# Patient Record
Sex: FEMALE | Race: WHITE
[De-identification: names, ages, dates, MRNs, and addresses within clinical notes are randomized per-mention and may not be internally consistent; named-entity substitution may affect disease eponyms.]

---

## 2017-01-06 ENCOUNTER — HOSPITAL ENCOUNTER (EMERGENCY)
Dept: HOSPITAL 68 - ERH | Age: 27
End: 2017-01-06
Payer: COMMERCIAL

## 2017-01-06 VITALS — HEIGHT: 64 IN | BODY MASS INDEX: 25.61 KG/M2 | WEIGHT: 150 LBS

## 2017-01-06 VITALS — DIASTOLIC BLOOD PRESSURE: 86 MMHG | SYSTOLIC BLOOD PRESSURE: 132 MMHG

## 2017-01-06 DIAGNOSIS — K64.9: ICD-10-CM

## 2017-01-06 DIAGNOSIS — R19.7: Primary | ICD-10-CM

## 2017-01-06 DIAGNOSIS — R10.9: ICD-10-CM

## 2017-01-06 LAB
ABSOLUTE GRANULOCYTE CT: 11 /CUMM (ref 1.4–6.5)
BASOPHILS # BLD: 0.1 /CUMM (ref 0–0.2)
BASOPHILS NFR BLD: 1.2 % (ref 0–2)
EOSINOPHIL # BLD: 0.3 /CUMM (ref 0–0.7)
EOSINOPHIL NFR BLD: 2.4 % (ref 0–5)
ERYTHROCYTE [DISTWIDTH] IN BLOOD BY AUTOMATED COUNT: 13.9 % (ref 11.5–14.5)
GRANULOCYTES NFR BLD: 84.9 % (ref 42.2–75.2)
HCT VFR BLD CALC: 40.3 % (ref 37–47)
LYMPHOCYTES # BLD: 1 /CUMM (ref 1.2–3.4)
MCH RBC QN AUTO: 30.1 PG (ref 27–31)
MCHC RBC AUTO-ENTMCNC: 33.7 G/DL (ref 33–37)
MCV RBC AUTO: 89.3 FL (ref 81–99)
MONOCYTES # BLD: 0.5 /CUMM (ref 0.1–0.6)
PLATELET # BLD: 344 /CUMM (ref 130–400)
PMV BLD AUTO: 9.4 FL (ref 7.4–10.4)
RED BLOOD CELL CT: 4.51 /CUMM (ref 4.2–5.4)
WBC # BLD AUTO: 12.9 /CUMM (ref 4.8–10.8)

## 2017-01-06 NOTE — ED GENERAL ADULT
History of Present Illness
 
General
Chief Complaint: Abdominal Pain/Flank Pain
Stated Complaint: ABD PAIN
Source: patient
Exam Limitations: no limitations
 
Vital Signs & Intake/Output
Vital Signs & Intake/Output
 Vital Signs
 
 
Date Time Temp Pulse Resp B/P Pulse O2 O2 Flow FiO2
 
     Ox Delivery Rate 
 
 1408 97.8 82 18 132/86 100 Room Air  
 
 1212 97.6 78 17 138/89 99 Room Air  
 
 0944 97.2 96 18 136/92 99 Room Air  
 
 
Allergies
Coded Allergies:
NO KNOWN ALLERGIES (16)
 
Reconcile Medications
Dicyclomine Hydrochloride (Bentyl) 10 MG CAPSULE   1 CAP PO TID PRN CRAMPS
Esomeprazole (Nexium) 40 MG CAPSULE.DR   1 CAP PO BID GI  (Reported)
NORGESTIMATE-ETHINYL ESTRADIOL (Ortho Tri-Cyclen Lo Tablet) [id number] LO TABLET   
1 TAB PO DAILY BIRTH CONTROL  (Reported)
Ondansetron HCl (Zofran) 4 MG TABLET   1 TAB PO Q8P PRN NAUSEA
 
Triage Note:
27 Y/O FEMALE C/O N/V/D AND PAIN FROM HEMMOROID
 SINCE LAST NIGHT.  REPORTS RECENT ILLNESS IN HOME
 WITH SAME SYMPTOMS.  AFEBRILE.
 CURRENT FINISHING MENSES, "NORMAL" PER PT
Triage Nurses Notes Reviewed? yes
Onset: Abrupt
Duration: day(s):
Timing: recent history
Pregnant: No
Patient currently breastfeeds: No
HPI:
 
 
 
17
11 AM
26-year-old female presents to the emergency department with multiple episodes 
of diarrhea, nausea, some mild abdominal cramping and severe hemorrhoidal pain. 
The patient states that over the last 24 hours she's had multiple episodes of 
watery diarrhea there was even some blood in it.  Now she said the pains are 
from her hemorrhoid ; severe.  The onset of symptoms were abrupt, the duration 
has been approximately 48 hours, the severity is significant; as her symptoms 
required her to come to the emergency department for care.  She has a past 
medical history of migraine headaches, abdominal pain, status post appendectomy 
and cholecystectomy.  On physical exam she has some lower abdominal tenderness 
no rebound or guarding she does have a external moderately inflamed hemorrhoid 
with some erythema surrounding the perianal area.  Rectal exam was guaiac 
negative.
 
Past History
 
Travel History
Traveled to [place] past 21 day No
 
Medical History
Any Pertinent Medical History? see below for history
Neurological: NONE
EENT: NONE
Cardiovascular: NONE
Respiratory: NONE
Gastrointestinal: GERD, hiatal hernia
Hepatic: NONE
Renal: KIDNEY STONES KIDNEY STENTS
Musculoskeletal: NONE
Psychiatric: NONE
Endocrine: NONE
Blood Disorders: NONE
Cancer(s): NONE
GYN/Reproductive: NONE
History of MRSA: No
History of VRE: No
History of CDIFF: No
 
Surgical History
Surgical History: appendectomy, cholecystectomy, , d&C
 
Psychosocial History
Who do you live with Family
Services at Home None
What is your primary language English
Tobacco Use: Quit >30 days ago
 
Family History
Hx Contributory? No
 
Review of Systems
 
Review of Systems
Constitutional:
Denies: fever. 
EENTM:
Denies: visual changes. 
Respiratory:
Denies: short of breath. 
Cardiovascular:
Denies: chest pain. 
GI:
Reports: abdominal pain, diarrhea, nausea. 
Genitourinary:
Reports: see HPI. 
Musculoskeletal:
Reports: no symptoms. 
Skin:
Reports: rash. 
Neurological/Psychological:
Denies: headache. 
Hematologic/Endocrine:
Reports: bleeding.  Denies: bruising. 
 
Physical Exam
 
Physical Exam
General Appearance: alert, awake, anxious, moderate distress
Head: atraumatic, normal appearance
Eyes:
Bilateral: normal appearance, PERRL, EOMI. 
Ears, Nose, Throat: normal pharynx, normal ENT inspection
Neck: normal inspection, supple, full range of motion
Respiratory: normal breath sounds, chest non-tender, no respiratory distress
Cardiovascular: regular rate/rhythm
Peripheral Pulses:
4+ radial (R), 4+ radial (L)
Gastrointestinal: soft, non-tender
Rectal: heme negative stool, hemorrhoids, IRRITATION
Extremities: normal inspection, normal range of motion, no edema
Neurologic/Psych: no motor/sensory deficits, awake, alert, oriented x 3
Skin: intact, normal color, warm/dry
 
Core Measures
ACS in differential dx? No
CVA/TIA Diagnosis: No
Severe Sepsis Present: No
Septic Shock Present: No
 
Progress
Differential Diagnoses
I considered the following diagnoses in my evaluation of the patient: [Irritable
bowel syndrome, viral gastroenteritis, intra-abdominal abscess, diverticulitis, 
ectopic pregnancy, pyelonephritis, external thrombosed hemorrhoid, hemorrhoids, 
perianal irritation]
 
Plan of Care:
 Orders
 
 
Procedure Date/time Status
 
URINALYSIS  1037 Complete
 
LIPASE  1037 Complete
 
COMPREHENSIVE METABOLIC PANEL  1037 Complete
 
CBC WITHOUT DIFFERENTIAL  1037 Complete
 
AMYLASE  1037 Complete
 
 
 Laboratory Tests
 
 
 
17 1105:
Urinalysis LIGHT  H, Urine Color BLDY  H, Urine Clarity CLDY  H, Urine pH 6.0, 
Ur Specific Gravity >= 1.030, Urine Protein 100  H, Urine Ketones NEG, Urine 
Nitrite POS  H, Urine Bilirubin NEG@ICTO, Urine Urobilinogen 0.2, Ur Leukocyte 
Esterase TRACE  H, Ur Microscopic SEDIMENT EXAMINED, Urine RBC >75  H, Urine WBC
1-3  H, Ur Epithelial Cells FEW, Urine Hemoglobin LARGE  H, Urine Glucose NEG
 
17 1050:
Anion Gap 16, Estimated GFR > 60, BUN/Creatinine Ratio 20.0, Glucose 92, Calcium
10.0, Total Bilirubin 0.9, AST 20, ALT 30, Alkaline Phosphatase 81, Total 
Protein 8.0, Albumin 4.7, Globulin 3.3, Albumin/Globulin Ratio 1.4, Amylase < 30
 L, Lipase 49, CBC w Diff NO MAN DIFF REQ, RBC 4.51, MCV 89.3, MCH 30.1, RDW 
13.9, MPV 9.4, Gran % 84.9  H, Lymphocytes % 7.5  L, Monocytes % 4.0, 
Eosinophils % 2.4, Basophils % 1.2, Absolute Granulocytes 11.0  H, Absolute 
Lymphocytes 1.0  L, Absolute Monocytes 0.5, Absolute Eosinophils 0.3, Absolute 
Basophils 0.1, PUBS MCHC 33.7
Initial ED EKG: none
 
Departure
 
Departure
Disposition: STILL A PATIENT
Condition: Stable
Clinical Impression
Primary Impression: Diarrhea
Secondary Impressions: Abdominal pain, Hemorrhoid
Referrals:
FAUSTO CANALES MD (PCP/Family)
 
Departure Forms:
Customer Survey
General Discharge Information
Prescriptions:
Current Visit Scripts
Ondansetron HCl (Zofran) 1 TAB PO Q8P PRN NAUSEA
     #6 TAB 
 
Dicyclomine Hydrochloride (Bentyl) 1 CAP PO TID PRN CRAMPS
     #20 CAP 
 
 
Comments
 
 
 
17
2:28 PM
The patient has no abdominal pain at this time.  She status post appendectomy 
and cholecystectomy.  Liver enzymes and labs are essentially normal.  She does 
have hematuria but she is currently on her menstrual period.  Her perianal pain 
was somewhat relieved with viscous lidocaine.  I will treat her external 
hemorrhoid and her likely gastroenteritis symptoms.  She will follow-up with her
doctor on Monday or return to the emergency department if worse.
 
Critical Care Note
 
Critical Care Note
Critical Care Time: non-applicable

## 2017-01-18 ENCOUNTER — HOSPITAL ENCOUNTER (EMERGENCY)
Dept: HOSPITAL 68 - ERH | Age: 27
End: 2017-01-18
Payer: COMMERCIAL

## 2017-01-18 VITALS — WEIGHT: 145 LBS | BODY MASS INDEX: 24.75 KG/M2 | HEIGHT: 64 IN

## 2017-01-18 VITALS — SYSTOLIC BLOOD PRESSURE: 123 MMHG | DIASTOLIC BLOOD PRESSURE: 89 MMHG

## 2017-01-18 DIAGNOSIS — K58.0: ICD-10-CM

## 2017-01-18 DIAGNOSIS — R10.11: Primary | ICD-10-CM

## 2017-01-18 LAB
ABSOLUTE GRANULOCYTE CT: 8.6 /CUMM (ref 1.4–6.5)
BASOPHILS # BLD: 0.2 /CUMM (ref 0–0.2)
BASOPHILS NFR BLD: 1.5 % (ref 0–2)
EOSINOPHIL # BLD: 0.1 /CUMM (ref 0–0.7)
EOSINOPHIL NFR BLD: 1.2 % (ref 0–5)
ERYTHROCYTE [DISTWIDTH] IN BLOOD BY AUTOMATED COUNT: 13.6 % (ref 11.5–14.5)
GRANULOCYTES NFR BLD: 75 % (ref 42.2–75.2)
HCT VFR BLD CALC: 37.5 % (ref 37–47)
LYMPHOCYTES # BLD: 2 /CUMM (ref 1.2–3.4)
MCH RBC QN AUTO: 29.8 PG (ref 27–31)
MCHC RBC AUTO-ENTMCNC: 34.3 G/DL (ref 33–37)
MCV RBC AUTO: 86.9 FL (ref 81–99)
MONOCYTES # BLD: 0.6 /CUMM (ref 0.1–0.6)
PLATELET # BLD: 330 /CUMM (ref 130–400)
PMV BLD AUTO: 8.1 FL (ref 7.4–10.4)
RED BLOOD CELL CT: 4.32 /CUMM (ref 4.2–5.4)
WBC # BLD AUTO: 11.5 /CUMM (ref 4.8–10.8)

## 2017-01-18 NOTE — ED GI/GU/ABDOMINAL COMPLAINT
History of Present Illness
 
General
Chief Complaint: Nausea, Vomiting, Diarrhea
Stated Complaint: N/V/D, ABDOMINAL PAIN
Source: patient
Exam Limitations: no limitations
 
Vital Signs & Intake/Output
Vital Signs & Intake/Output
 Vital Signs
 
 
Date Time Temp Pulse Resp B/P Pulse O2 O2 Flow FiO2
 
     Ox Delivery Rate 
 
 1642 97.9 100 16 123/89 99 Room Air  
 
 
Allergies
Coded Allergies:
NO KNOWN ALLERGIES (16)
 
Reconcile Medications
Dicyclomine Hydrochloride (Bentyl) 10 MG CAPSULE   1 CAP PO TID PRN CRAMPS
Esomeprazole (Nexium) 40 MG CAPSULE.DR   1 CAP PO BID GI  (Reported)
NORGESTIMATE-ETHINYL ESTRADIOL (Ortho Tri-Cyclen Lo Tablet) [id number] LO TABLET   
1 TAB PO DAILY BIRTH CONTROL  (Reported)
Ondansetron (Zofran Odt) 4 MG TAB.RAPDIS   1 TAB SL TID PRN nausea and vomiting
Ondansetron HCl (Zofran) 4 MG TABLET   1 TAB PO Q8P PRN NAUSEA
Tramadol HCl 50 MG TABLET   1 TAB PO BIDP PRN pain
 
Triage Note:
PT C/O N/V/D FOR AND ABD PAIN SINCE THIS AM.
Triage Nurses Notes Reviewed? yes
Pregnant? N
Is pt currently breastfeeding? No
HPI:
This patient is a 26-year-old female with past medical history including GERD 
and hiatal hernia who presented to the emergency department today for evaluation
of nausea, vomiting, diarrhea, and abdominal pain since this morning.  The 
patient reported that she woke up this morning and had approximately 5 episodes 
of nonbloody diarrhea.  She reported that she started feeling nauseous and she 
laid down to sleep.  She reported that at approximately 1:00 this afternoon she 
started vomiting.  She reported that she vomited 3 times.  Nonbloody bile.  The 
patient reported 10 out of 10 right upper quadrant abdominal pain which is 
nonradiating and constant since onset.  No palliative or provoking factors.  She
reported that it is not related to food or drink intake.  She denied any fevers,
chills, chest pain, difficult breathing, back pain, urinary burning, urgency, 
frequency, or blood in the urine.  The patient reported that her last menstrual 
period was approximately 2 weeks ago and normal.  Her last bowel movement was 
approximately 2 days ago and normal.
(AN MINA,JESSICA)
 
Past History
 
Travel History
Traveled to [place] past 21 day No
 
Medical History
Any Pertinent Medical History? see below for history
Neurological: NONE
EENT: NONE
Cardiovascular: NONE
Respiratory: NONE
Gastrointestinal: GERD, hiatal hernia
Hepatic: NONE
Renal: KIDNEY STONES
Musculoskeletal: NONE
Psychiatric: NONE
Endocrine: NONE
Blood Disorders: NONE
Cancer(s): NONE
GYN/Reproductive: NONE
History of MRSA: No
History of VRE: No
History of CDIFF: No
 
Surgical History
Surgical History: appendectomy, cholecystectomy, , d&C, kidney stents
 
Psychosocial History
Who do you live with Family
Services at Home None
What is your primary language English
Tobacco Use: Quit >30 days ago
ETOH Use: denies use
Illicit Drug Use: denies illicit drug use
 
Family History
Hx Contributory? No
(JESSICA NOLAN PA-C)
 
Review of Systems
 
Review of Systems
Constitutional:
Reports: no symptoms. 
EENTM:
Reports: no symptoms. 
Respiratory:
Reports: no symptoms. 
Cardiovascular:
Reports: no symptoms. 
GI:
Reports: see HPI. 
Genitourinary:
Reports: no symptoms. 
Musculoskeletal:
Reports: no symptoms. 
Skin:
Reports: no symptoms. 
Neurological/Psychological:
Reports: no symptoms. 
All Other Systems: Reviewed and Negative
(JESSICA NOLAN PA-C)
 
Physical Exam
 
Physical Exam
Gastrointestinal: normal bowel sounds, soft, no organomegaly, nondistended.  No 
organomegaly appreciated.  Tympanic to percussion in all 4 quadrants.  Right 
upper quadrant tenderness to palpation with no rebound or guarding.  No McBurney
's point tenderness.
Comments:
Well-developed well-nourished person in mild distress
HEENT: Normal EENT exam, head normocephalic, moist mucous membranes
Pupils equally round and reactive to light.
Neck: Supple, no lymphadenopathy
Back: Normal gait.  Normal inspection
Cardiovascular: Regular rate and rhythm with no murmurs, rubs or gallops
Respiratory: No respiratory distress. Breath sounds clear to auscultation 
bilaterally
Extremity: Normal and equal pulses.
Neuro: Alert oriented x3, cranial nerves II through XII grossly intact.
Skin: No appreciable rash on exposed skin, skin is warm and dry.
Psych: Mood and affect is normal
 
Core Measures
ACS in differential dx? No
Severe Sepsis Present: No
Septic Shock Present: No
(JESSICA NOLAN PA-C)
 
Progress
Differential Diagnosis: AAA, AMI, biliary colic, bowel obstruction, colon cancer
, cholecystitis, diverticulitis, ectopic pregnancy, endometritis, gastritis, 
hepatitis, ischemic bowel, inflamm bowel dis, intrauterine pregnancy, kidney 
stone, ovarian cyst, ovarian torsion, pancreatitis, PID/cervicitis, PUD/GERD, 
perforated viscous, threatened AB, UTI/pyelo
Plan of Care:
 Orders
 
 
Procedure Date/time Status
 
LACTIC ACID 2033 Active
 
RAPID VIRAL INFLUENZA A 1733 Complete
 
URINE PREGNANCY 1733 Complete
 
URINALYSIS 1733 Complete
 
LIPASE 1733 Complete
 
LACTIC ACID 1733 Complete
 
DIRECT BILIRUBIN 1733 Complete
 
COMPREHENSIVE METABOLIC PANEL 1733 Complete
 
CBC WITHOUT DIFFERENTIAL 1733 Complete
 
AMYLASE 1733 Complete
 
 
 Laboratory Tests
 
 
17 1805:
Urinalysis LIGHT  H, Urine Color YEL, Urine Clarity HAZY  H, Urine pH 6.0, Ur 
Specific Gravity 1.025, Urine Protein NEG, Urine Ketones >=80, Urine Nitrite NEG
, Urine Bilirubin NEG, Urine Urobilinogen 0.2, Ur Leukocyte Esterase NEG, Ur 
Microscopic SEDIMENT EXAMINED, Urine RBC RARE, Urine WBC 1-3  H, Ur Epithelial 
Cells PACKD  H, Urine Mucus MANY  H, Urine Hemoglobin TRACE-INTACT, Urine 
Glucose NEG, Urine Pregnancy Test NEGATIVE
 
17 1754:
Anion Gap 14, Estimated GFR > 60, BUN/Creatinine Ratio 11.7, Glucose 84, Lactic 
Acid 0.8, Calcium 9.8, Total Bilirubin 1.0, Direct Bilirubin 0.4, AST 24, ALT 39
, Alkaline Phosphatase 139  H, Total Protein 7.4, Albumin 4.4, Globulin 3.0, 
Albumin/Globulin Ratio 1.5, Amylase < 30  L, Lipase 36, CBC w Diff NO MAN DIFF 
REQ, RBC 4.32, MCV 86.9, MCH 29.8, RDW 13.6, MPV 8.1, Gran % 75.0, Lymphocytes %
17.2  L, Monocytes % 5.1, Eosinophils % 1.2, Basophils % 1.5, Absolute 
Granulocytes 8.6  H, Absolute Lymphocytes 2.0, Absolute Monocytes 0.6, Absolute 
Eosinophils 0.1, Absolute Basophils 0.2, PUBS MCHC 34.3
Diagnostic Imaging:
Viewed by Me: Ultrasound.  Discussed w/RAD: Ultrasound. 
Radiology Impression: PATIENT: PAGE NAIR  MEDICAL RECORD NO: [id number] 
PRESENT AGE: 26  PATIENT ACCOUNT NO: [id number] : 88  LOCATION: [facility] 
ORDERING PHYSICIAN: JESSICA NOLAN PA-C     SERVICE DATE:  EXAM 
TYPE: US - US-LIMITED ABDOMEN EXAMINATION: US ABDOMEN LIMITED CLINICAL 
INFORMATION: Right upper quadrant pain. Concern for retained gallbladder stone. 
Status post cholecystectomy, 2016.. COMPARISON: Ultrasound of abdomen 2015 TECHNIQUE: Real-time imaging of the right upper quadrant abdominal 
viscera. Color Doppler exam utilized. FINDINGS: PANCREAS: Normal. LIVER: Normal.
The liver demonstrates normal size, contour and echogenicity. No focal lesion or
intrahepatic biliary duct dilatation. GALLBLADDER: Status post cholecystectomy. 
No fluid collection at the gallbladder bed. COMMON BILE DUCT: Normal in caliber 
measuring 0.6 cm in diameter. No evidence of gallstone in the visualized bile 
ducts. RIGHT KIDNEY: Normal. No hydronephrosis. No renal calculi or focal 
parenchymal lesions. The kidney measures 10.4 cm in maximum dimension. FREE 
FLUID: None. IMPRESSION: Status post cholecystectomy. No bile duct dilatation. 
No evidence of choledocholithiasis. DICTATED BY: CURTIS STANFORD MD  DATE/TIME 
DICTATED:17 :[facility]  DATE/TIME TRANSCRIBED:1902 CONFIDENTIAL, DO NOT COPY WITHOUT APPROPRIATE AUTHORIZATION.  <
Electronically signed in Other Vendor System>                                   
                                                    SIGNED BY: CURTIS STANFORD MD 1908
Initial ED EKG: none
Comments:
2017 7:13:18 PM: This patient reported that her abdominal pain seems to be 
better than before.  She reported relief of her nausea.  1 L of IV fluids has 
infused.  She reported that she feels comfortable to go home.  She will follow 
up with her GI doctor.
(AN MINA,JESSICA)
 
Departure
 
Departure
Disposition: HOME OR SELF CARE
Condition: Stable
Clinical Impression
Primary Impression: Abdominal pain
Qualifiers:  Abdominal location: unspecified location Qualified Code: R10.9 - 
Unspecified abdominal pain
Referrals:
FAUSTO CANALES MD (PCP/Family)
 
Additional Instructions:
Take medication for pain as prescribed.  Takes Zofran as prescribed for nausea. 
Please call your gastroenterologist tomorrow for a follow-up appointment.  
Return for any worsening symptoms or concerns.  Please stay on a clear liquid 
diet over the next 24 hours and then advance her diet as tolerated.
Departure Forms:
Customer Survey
General Discharge Information
Prescriptions:
Current Visit Scripts
Tramadol HCl 1 TAB PO BIDP PRN pain
     #6 TAB 
 
Ondansetron (Zofran Odt) 1 TAB SL TID PRN nausea and vomiting
     #10 TAB 
 
 
(JESSICA NOLAN PA-C)
 
PA/NP Co-Sign Statement
Statement:
ED Attending supervision documentation-
 
[] I saw and evaluated the patient. I have also reviewed all the pertinent lab 
results and diagnostic results. I agree with the findings and the plan of care 
as documented in the PA's/NP's documentation. 
 
[X] I have reviewed the ED Record and agree with the PA's/NP's documentation.
 
[] Additions or exceptions (if any) to the PAs/NP's note and plan are 
summarized below:
[]
 
(DALLAS SIMONS,DILIP)

## 2017-01-18 NOTE — ULTRASOUND REPORT
EXAMINATION:
US ABDOMEN LIMITED
 
CLINICAL INFORMATION:
Right upper quadrant pain. Concern for retained gallbladder stone. Status
post cholecystectomy, 04/20/2016..
 
COMPARISON:
Ultrasound of abdomen 12/09/2015
 
TECHNIQUE:
Real-time imaging of the right upper quadrant abdominal viscera.
Color Doppler exam utilized.
 
FINDINGS:
 
PANCREAS: Normal.
 
LIVER: Normal. The liver demonstrates normal size, contour and echogenicity.
No focal lesion or intrahepatic biliary duct dilatation.
 
GALLBLADDER: Status post cholecystectomy. No fluid collection at the
gallbladder bed.
 
COMMON BILE DUCT: Normal in caliber measuring 0.6 cm in diameter. No evidence
of gallstone in the visualized bile ducts.
 
RIGHT KIDNEY: Normal. No hydronephrosis. No renal calculi or focal
parenchymal lesions. The kidney measures 10.4 cm in maximum dimension.
 
FREE FLUID: None.
 
IMPRESSION:
Status post cholecystectomy. No bile duct dilatation. No evidence of
choledocholithiasis.

## 2017-03-31 ENCOUNTER — HOSPITAL ENCOUNTER (EMERGENCY)
Dept: HOSPITAL 68 - ERH | Age: 27
End: 2017-03-31
Payer: COMMERCIAL

## 2017-03-31 VITALS — DIASTOLIC BLOOD PRESSURE: 84 MMHG | SYSTOLIC BLOOD PRESSURE: 126 MMHG

## 2017-03-31 VITALS — HEIGHT: 64 IN | BODY MASS INDEX: 22.2 KG/M2 | WEIGHT: 130 LBS

## 2017-03-31 DIAGNOSIS — R10.84: Primary | ICD-10-CM

## 2017-03-31 DIAGNOSIS — M25.572: ICD-10-CM

## 2017-03-31 DIAGNOSIS — R11.2: ICD-10-CM

## 2017-03-31 LAB
ABSOLUTE GRANULOCYTE CT: 6.6 /CUMM (ref 1.4–6.5)
BASOPHILS # BLD: 0.1 /CUMM (ref 0–0.2)
BASOPHILS NFR BLD: 1.5 % (ref 0–2)
EOSINOPHIL # BLD: 0.1 /CUMM (ref 0–0.7)
EOSINOPHIL NFR BLD: 1.2 % (ref 0–5)
ERYTHROCYTE [DISTWIDTH] IN BLOOD BY AUTOMATED COUNT: 13.8 % (ref 11.5–14.5)
GRANULOCYTES NFR BLD: 68.7 % (ref 42.2–75.2)
HCT VFR BLD CALC: 41 % (ref 37–47)
LYMPHOCYTES # BLD: 2.3 /CUMM (ref 1.2–3.4)
MCH RBC QN AUTO: 29.5 PG (ref 27–31)
MCHC RBC AUTO-ENTMCNC: 33.7 G/DL (ref 33–37)
MCV RBC AUTO: 87.6 FL (ref 81–99)
MONOCYTES # BLD: 0.5 /CUMM (ref 0.1–0.6)
PLATELET # BLD: 389 /CUMM (ref 130–400)
PMV BLD AUTO: 8.1 FL (ref 7.4–10.4)
RED BLOOD CELL CT: 4.68 /CUMM (ref 4.2–5.4)
WBC # BLD AUTO: 9.7 /CUMM (ref 4.8–10.8)

## 2017-03-31 NOTE — RADIOLOGY REPORT
EXAMINATION:
XR ANKLE, LEFT
 
CLINICAL INFORMATION:
Left ankle pain status post fall down stairs.
 
COMPARISON:
06/25/2015
 
TECHNIQUE:
AP, lateral, and mortise views of the left ankle.
 
FINDINGS:
No acute fracture or dislocation. The ankle mortise is congruent. The soft
tissues are unremarkable. No ankle joint effusion. There is hypertrophic
spurring at the plantar aponeurosis of the calcaneus.
 
IMPRESSION:
No acute fracture or malalignment.

## 2017-03-31 NOTE — ED GI/GU/ABDOMINAL COMPLAINT
History of Present Illness
 
General
Chief Complaint: Nausea, Vomiting, Diarrhea
Stated Complaint: VOMITING X SINCE AM, L FOOT INJURY S/P FALL
Source: patient, old records
Exam Limitations: no limitations
 
Vital Signs & Intake/Output
Vital Signs & Intake/Output
 Vital Signs
 
 
Date Time Temp Pulse Resp B/P Pulse O2 O2 Flow FiO2
 
     Ox Delivery Rate 
 
 1053 98.6 76 18 126/84 98 Room Air  
 
 0731 97.9 108 18 124/97 98 Room Air  
 
 
Allergies
Coded Allergies:
NO KNOWN ALLERGIES (16)
 
Reconcile Medications
Dicyclomine Hydrochloride (Bentyl) 10 MG CAPSULE   1 CAP PO TID PRN CRAMPS
Esomeprazole (Nexium) 40 MG CAPSULE.DR   1 CAP PO BID GI  (Reported)
NORGESTIMATE-ETHINYL ESTRADIOL (Ortho Tri-Cyclen Lo Tablet) 1MQEAA3 LO TABLET   
1 TAB PO DAILY BIRTH CONTROL  (Reported)
Ondansetron (Zofran Odt) 4 MG TAB.RAPDIS   1 TAB PO Q6 PRN NAUSEA
Ondansetron (Zofran Odt) 4 MG TAB.RAPDIS   1 TAB SL TID PRN nausea and vomiting
Ondansetron HCl (Zofran) 4 MG TABLET   1 TAB PO Q8P PRN NAUSEA
Tramadol HCl 50 MG TABLET   1 TAB PO BIDP PRN pain
 
Triage Note:
PT STATES THAT SHE HAS BEEN UP SINCE 0100
 VOMITTING, DENIES ABD PAIN, STATES THAT SHE TOOK
 ZOFRAN WITH NO RELIEF. ALSO STATES THAT SHE
 TRIPPED ON HER STAIRS AT 0300 TWISTING HER L
 ANKLE. PT ABLE TO AMBULATE BUT STATES THAT IT
 HURTS. PT UNABLE TO TAKE PO MEDS AT TRIAGE FOR
 PAIN DUE TO DRY HEAVING. PT KEEPS STATING THAT SHE
 NEEDS SOMETHING TO DRINK. PT AWARE THAT SHE CAN
 NOT HAVE ANYTHING AT THIS TIME
Triage Nurses Notes Reviewed? yes
Pregnant? N
Is pt currently breastfeeding? No
Onset: Abrupt
Duration: hour(s): (MULTIPLE)
Timing: multiple episodes today
Quality/Severity: severe
Location: generalized abdomen
Radiation: no radiation
Activities at Onset: sleep
Prior Abdominal Problems: similar symptoms
Associated Symptoms: abdominal pain, nausea/vomiting
HPI:
26 year old female who presents to the ER today for chief complaint of abdominal
pain and nausea that started at 1 am.  At 3 am she vomited and fell down the 
stairs and hurt her left ankle.  Patient endorses diarrhea, runny nose, no fever
or chills.  Denies any unusual foods prior to this.  Patient is due to have an 
outpatient colonoscopy by Dr. Manzanares next week.  She denies any change in diet.  
She is on Bentyl and Nexium with good relief.  She has not been able to tolerate
any fluids for the past 12 hours.
 
Past History
 
Travel History
Traveled to [place] past 21 day No
 
Medical History
Any Pertinent Medical History? see below for history
Neurological: NONE
EENT: NONE
Cardiovascular: NONE
Respiratory: NONE
Gastrointestinal: GERD, hiatal hernia
Hepatic: NONE
Renal: KIDNEY STONES
Musculoskeletal: NONE
Psychiatric: NONE
Endocrine: NONE
Blood Disorders: NONE
Cancer(s): NONE
GYN/Reproductive: NONE
History of MRSA: No
History of VRE: No
History of CDIFF: No
 
Surgical History
Surgical History: appendectomy, cholecystectomy, , d&C, kidney stents
 
Psychosocial History
Who do you live with Family
Services at Home None
What is your primary language English
Tobacco Use: Never used
Daily Tobacco Use Amount/Type: =< 4 Cigarettes daily
ETOH Use: occasional use
Illicit Drug Use: marijuana
 
Family History
Hx Contributory? No
 
Review of Systems
 
Review of Systems
Constitutional:
Denies: chills, fever. 
EENTM:
Reports: no symptoms. 
Respiratory:
Reports: no symptoms. 
Cardiovascular:
Reports: no symptoms. 
GI:
Reports: abdominal pain, diarrhea, nausea, vomiting. 
Genitourinary:
Reports: no symptoms. 
Musculoskeletal:
Reports: no symptoms. 
Skin:
Reports: no symptoms. 
Neurological/Psychological:
Reports: no symptoms. 
Hematologic/Endocrine:
Denies: bruising, bleeding. 
Immunologic/Allergic:
Reports: no symptoms. 
All Other Systems: Reviewed and Negative
 
Physical Exam
 
Physical Exam
General Appearance: well developed/nourished, alert, awake, moderate distress
Head: atraumatic, normal appearance
Eyes:
Bilateral: normal appearance, PERRL, EOMI. 
Ears, Nose, Throat, Mouth: hearing grossly normal, moist mucous membrane
Neck: normal inspection, supple, full range of motion
Respiratory: normal breath sounds, chest non-tender, no respiratory distress
Cardiovascular: regular rate/rhythm
Peripheral Pulses:
2+ radial (R), 2+ radial (L)
Gastrointestinal: voluntary guarding, NORMOACTIVE BOWEL SOUNDS
Back: normal inspection, normal range of motion
Extremities: normal range of motion
Neurologic/Psych: awake, alert
Skin: intact, normal color, warm/dry
 
Core Measures
ACS in differential dx? No
Severe Sepsis Present: No
Septic Shock Present: No
 
Progress
Differential Diagnosis: peptic ulcer, PUD/GERD, gASTROENTERITIS, PHYSICAL 
VOMITING SYNDROME, VOMITING SECONDARY TO CHRONIC MARIJUANA USE, ANKLE SPRAIN, 
ANKLE FRACTURE
Plan of Care:
 Orders
 
 
Procedure Date/time Status
 
Clear Liquid Diet  L Active
 
URINALYSIS  0800 Active
 
LIPASE  0753 Complete
 
LACTIC ACID  0753 Complete
 
HUMAN BETA HCG SCREEN 753 Complete
 
COMPREHENSIVE METABOLIC PANEL 753 Complete
 
CBC WITHOUT DIFFERENTIAL 753 Complete
 
 
 Laboratory Tests
 
 
 
17 1058:
Urine Color Pending, Urine Clarity Pending, Urine pH Pending, Ur Specific 
Gravity Pending, Urine Protein Pending, Urine Ketones Pending, Urine Nitrite 
Pending, Urine Bilirubin Pending, Urine Urobilinogen Pending, Ur Leukocyte 
Esterase Pending, Ur Microscopic SEDIMENT EXAMINED, Urine RBC Pending, Urine 
Hemoglobin Pending, Urine Glucose Pending
 
17 1053:
Lactic Acid Cancelled
 
17 0805:
Anion Gap 11, Estimated GFR > 60, BUN/Creatinine Ratio 15.7, Glucose 91, Lactic 
Acid 2.0, Calcium 10.2, Total Bilirubin 0.9, AST 18, ALT 29, Alkaline 
Phosphatase 90, Total Protein 7.9, Albumin 4.8, Globulin 3.1, Albumin/Globulin 
Ratio 1.5, Lipase 36, Total Beta HCG NEGATIVE, CBC w Diff NO MAN DIFF REQ, RBC 
4.68, MCV 87.6, MCH 29.5, RDW 13.8, MPV 8.1, Gran % 68.7, Lymphocytes % 23.3, 
Monocytes % 5.3, Eosinophils % 1.2, Basophils % 1.5, Absolute Granulocytes 6.6  
H, Absolute Lymphocytes 2.3, Absolute Monocytes 0.5, Absolute Eosinophils 0.1, 
Absolute Basophils 0.1, PUBS MCHC 33.7
 
 
9:51 AM
Patient reports slight improvement after IV Phenergan.  She still feels 
nauseated.  He reported that we need to initiate clear liquid challenge at this 
time.  D5 normal saline ordered.
(DALLAS SIMONS,DILIP)
Diagnostic Imaging:
Viewed by Me: Radiology Read.  Discussed w/RAD: Radiology Read. 
Radiology Impression: no acute abnormality, no fracture, no dislocation
Initial ED EKG: none
 
Departure
 
Departure
Time of Disposition: 
Disposition: HOME OR SELF CARE
Condition: Stable
Clinical Impression
Primary Impression: Abdominal pain
Secondary Impressions: Nausea & vomiting
Referrals:
ALLY SIMONS,FAUSTO (PCP/Family)
NUNO BACA MD
 
Additional Instructions:
Continue your Bentyl and Nexium.  Take the Zofran dissolvable tablets as needed.
 They are in their pharmacy.  Please follow-up with your outpatient colonoscopy 
by Dr. Manzanares next week.
Departure Forms:
Customer Survey
General Discharge Information
Prescriptions:
Current Visit Scripts
Ondansetron (Zofran Odt) 1 TAB PO Q6 PRN NAUSEA
     #20 TAB

## 2017-07-03 ENCOUNTER — HOSPITAL ENCOUNTER (EMERGENCY)
Dept: HOSPITAL 68 - ERH | Age: 27
Discharge: STILL ADMITTED OR ADMINISTRATIVE | End: 2017-07-03
Payer: COMMERCIAL

## 2017-07-03 VITALS — BODY MASS INDEX: 23.9 KG/M2 | HEIGHT: 64 IN | WEIGHT: 140 LBS

## 2017-07-03 VITALS — SYSTOLIC BLOOD PRESSURE: 124 MMHG | DIASTOLIC BLOOD PRESSURE: 83 MMHG

## 2017-07-03 DIAGNOSIS — S90.869A: Primary | ICD-10-CM

## 2017-07-03 NOTE — ED ANIMAL BITE/WOUND CHECK
History of Present Illness
 
General
Chief Complaint: Animal/Insect Bite
Stated Complaint: INSECT BITE
 
Vital Signs & Intake/Output
Vital Signs & Intake/Output
 Vital Signs
 
 
Date Time Temp Pulse Resp B/P B/P Pulse O2 O2 Flow FiO2
 
     Mean Ox Delivery Rate 
 
 1148 96.7 50 16 124/83  99 Room Air  
 
 
Allergies
Coded Allergies:
NO KNOWN ALLERGIES (16)
 
Reconcile Medications
Dicyclomine Hydrochloride (Bentyl) 10 MG CAPSULE   1 CAP PO TID PRN CRAMPS
Esomeprazole (Nexium) 40 MG CAPSULE.DR   1 CAP PO BID GI  (Reported)
NORGESTIMATE-ETHINYL ESTRADIOL (Ortho Tri-Cyclen Lo Tablet) [id number] LO TABLET   
1 TAB PO DAILY BIRTH CONTROL  (Reported)
Ondansetron (Zofran Odt) 4 MG TAB.RAPDIS   1 TAB PO Q6 PRN NAUSEA
Ondansetron (Zofran Odt) 4 MG TAB.RAPDIS   1 TAB SL TID PRN nausea and vomiting
Ondansetron HCl (Zofran) 4 MG TABLET   1 TAB PO Q8P PRN NAUSEA
Tramadol HCl 50 MG TABLET   1 TAB PO BIDP PRN pain
 
Triage Note:
PT WITH BUG BITES TO THE BOTTOM OF HER RIGHT FOOT.
 PT STATE SIT IS ITCHY
Pregnant: No
Patient currently breastfeeds: No
 
Past History
 
Travel History
Traveled to [place] past 21 day No
 
Medical History
Neurological: NONE
EENT: NONE
Cardiovascular: NONE
Respiratory: NONE
Gastrointestinal: GERD, hiatal hernia
Hepatic: NONE
Renal: KIDNEY STONES
Musculoskeletal: NONE
Psychiatric: NONE
Endocrine: NONE
Blood Disorders: NONE
Cancer(s): NONE
GYN/Reproductive: NONE
History of MRSA: No
History of VRE: No
History of CDIFF: No
 
Surgical History
Surgical History: appendectomy, cholecystectomy, , d&C, kidney stents
 
Psychosocial History
Who do you live with Family
Services at Home None
What is your primary language English
Tobacco Use: Quit >30 days ago
ETOH Use: occasional use
Illicit Drug Use: marijuana
 
Departure
 
Departure
Condition: Stable
Referrals:
FAUSTO CANALES MD (PCP/Family)
 
Departure Forms:
Customer Survey
General Discharge Information

## 2018-09-06 ENCOUNTER — HOSPITAL ENCOUNTER (EMERGENCY)
Dept: HOSPITAL 68 - ERH | Age: 28
End: 2018-09-06
Payer: COMMERCIAL

## 2018-09-06 VITALS — DIASTOLIC BLOOD PRESSURE: 78 MMHG | SYSTOLIC BLOOD PRESSURE: 116 MMHG

## 2018-09-06 VITALS — WEIGHT: 142 LBS | BODY MASS INDEX: 24.24 KG/M2 | HEIGHT: 64 IN

## 2018-09-06 DIAGNOSIS — R10.9: Primary | ICD-10-CM

## 2018-09-06 DIAGNOSIS — R14.0: ICD-10-CM

## 2018-09-06 DIAGNOSIS — Z87.891: ICD-10-CM

## 2018-09-06 DIAGNOSIS — K21.9: ICD-10-CM

## 2018-09-06 DIAGNOSIS — K46.9: ICD-10-CM

## 2018-09-06 LAB
ABSOLUTE GRANULOCYTE CT: 5.7 /CUMM (ref 1.4–6.5)
BASOPHILS # BLD: 0 /CUMM (ref 0–0.2)
BASOPHILS NFR BLD: 0.5 % (ref 0–2)
EOSINOPHIL # BLD: 0.1 /CUMM (ref 0–0.7)
EOSINOPHIL NFR BLD: 1.5 % (ref 0–5)
ERYTHROCYTE [DISTWIDTH] IN BLOOD BY AUTOMATED COUNT: 13.5 % (ref 11.5–14.5)
GRANULOCYTES NFR BLD: 67.3 % (ref 42.2–75.2)
HCT VFR BLD CALC: 39.8 % (ref 37–47)
LYMPHOCYTES # BLD: 1.9 /CUMM (ref 1.2–3.4)
MCH RBC QN AUTO: 30.9 PG (ref 27–31)
MCHC RBC AUTO-ENTMCNC: 34.3 G/DL (ref 33–37)
MCV RBC AUTO: 90 FL (ref 81–99)
MONOCYTES # BLD: 0.7 /CUMM (ref 0.1–0.6)
PLATELET # BLD: 286 /CUMM (ref 130–400)
PMV BLD AUTO: 8.3 FL (ref 7.4–10.4)
RED BLOOD CELL CT: 4.42 /CUMM (ref 4.2–5.4)
WBC # BLD AUTO: 8.5 /CUMM (ref 4.8–10.8)

## 2018-09-06 NOTE — CT SCAN REPORT
EXAMINATION:
CT ABDOMEN AND PELVIS WITH CONTRAST
 
CLINICAL INFORMATION:
Abdominal distention and pain.
 
COMPARISON:
05/06/2016.
 
TECHNIQUE:
Multidetector volumetric imaging was performed of the abdomen and pelvis
following IV administration of 95 mL of Optiray 320 intravenous contrast.
Sagittal and coronal reformatted images were obtained on the technologist's
workstation.
 
DLP:
272 mGy-cm
 
FINDINGS:
LUNG BASES: Unremarkable. No basilar consolidation or pleural effusion.
 
LIVER, GALLBLADDER, AND BILIARY TREE: The liver has normal size, shape, and
attenuation. No focal hepatic lesion. Gallbladder is surgically absent. No
intrahepatic or extrahepatic bile duct dilatation.
 
PANCREAS: Unremarkable.
 
SPLEEN: Unremarkable.
 
ADRENAL GLANDS: Unremarkable.
 
KIDNEYS AND URETERS: The kidneys have normal size, shape, and attenuation. No
hydroureteronephrosis, urolithiasis or perinephric stranding.
 
BLADDER: Unremarkable.
 
BOWEL AND PERITONEUM: The small and large bowel are normal in caliber. The
appendix appears to be surgically absent. No focal bowel wall thickening or
mesenteric fat stranding. No evidence of acute inflammation or obstruction
along the gastrointestinal tract.  No ascites or pneumoperitoneum.
 
ABDOMINAL WALL: No acute findings. Chronic, mild diastases of rectus
abdominis muscles of the mid abdominal wall. Minimal protrusion of fat into
the umbilicus.
 
LYMPH NODES: No pathologic sized lymph nodes in the abdomen or pelvis. No
inguinal lymphadenopathy.
 
VASCULAR: Unremarkable.
 
PELVIC: The anteflexed uterus is normal in size. A corpus luteum cyst of the
left ovary measures up to 2.7 cm. No pelvic free fluid.
 
MUSCULOSKELETAL: No suspicious bone lesions. There is an old right-sided pars
interarticularis defect of L5.
 
IMPRESSION:
No acute findings in the abdomen or pelvis compared to 05/06/2016. No
evidence of bowel obstruction or ascites.

## 2018-09-06 NOTE — ED GI/GU/ABDOMINAL COMPLAINT
History of Present Illness
 
General
Chief Complaint: Abdominal Pain/Flank Pain
Stated Complaint: ABD PAIN
Source: patient
Exam Limitations: no limitations
 
Vital Signs & Intake/Output
Vital Signs & Intake/Output
 Vital Signs
 
 
Date Time Temp Pulse Resp B/P B/P Pulse O2 O2 Flow FiO2
 
     Mean Ox Delivery Rate 
 
 1036 97.1 76 18 116/78  97 Room Air Room Air 
 
 
 
Allergies
Coded Allergies:
NO KNOWN ALLERGIES (16)
 
Reconcile Medications
Esomeprazole (Nexium) 40 MG CAPSULE.DR   1 CAP PO BID GI  (Reported)
Folic Acid 1 MG TABLET   1 TAB PO DAILY SUPPLEMENT  (Reported)
 
Triage Note:
PT TO ED WITH C/O ABD PAIN AND DISTENTION, NAUSEA,
 VOMITING, DIARRHEA, SAW DR LOCKWOOD, "MY ABD IS MORE
 DISTENDED AND SENT FOR FURTHER TESTING". LAST
 MENSES: 18
Triage Nurses Notes Reviewed? yes
Pregnant? N
Is pt currently breastfeeding? No
Onset: Abrupt
Timing: recent history
Radiation: no radiation
Activities at Onset: none
No Modifying Factors: none
HPI:
28-year-old female comes into the emergency room for further evaluation of 
abdominal pain and distention.  Patient reports that the symptoms and going on 
for the past month.  She denies any fever chills vomiting diarrhea or changes in
bowel movement.  She denies any urinary symptoms.  She denies any other 
associated symptoms.
(Finesse Lauren)
 
Past History
 
Travel History
Traveled to [place] past 21 day No
 
Medical History
Any Pertinent Medical History? see below for history
Neurological: NONE
EENT: NONE
Cardiovascular: NONE
Respiratory: NONE
Gastrointestinal: GERD, hiatal hernia, GASTRITIS
Hepatic: NONE
Renal: KIDNEY STONES
Musculoskeletal: NONE
Psychiatric: NONE
Endocrine: NONE
Blood Disorders: NONE
Cancer(s): NONE
GYN/Reproductive: NONE
History of MRSA: No
History of VRE: No
History of CDIFF: No
 
Surgical History
Surgical History: appendectomy, cholecystectomy, , d&C, kidney stents
 
Psychosocial History
Who do you live with Family
Services at Home None
What is your primary language English
Tobacco Use: Quit >30 days ago
ETOH Use: occasional use
Illicit Drug Use: marijuana
 
Family History
Hx Contributory? No
(Finesse Lauren)
 
Review of Systems
 
Review of Systems
Constitutional:
Reports: no symptoms. 
EENTM:
Reports: no symptoms. 
Respiratory:
Reports: no symptoms. 
Cardiovascular:
Reports: no symptoms. 
GI:
Reports: see HPI. 
Genitourinary:
Reports: no symptoms. 
Musculoskeletal:
Reports: no symptoms. 
Skin:
Reports: no symptoms. 
Neurological/Psychological:
Reports: no symptoms. 
Hematologic/Endocrine:
Reports: no symptoms. 
Immunologic/Allergic:
Reports: no symptoms. 
All Other Systems: Reviewed and Negative
(Finesse Lauren)
 
Physical Exam
 
Physical Exam
General Appearance: well developed/nourished, alert, awake
Head: atraumatic
Eyes:
Bilateral: normal appearance, EOMI. 
Ears, Nose, Throat, Mouth: hearing grossly normal, moist mucous membrane
Neck: normal inspection
Respiratory: normal breath sounds, no respiratory distress
Cardiovascular: regular rate/rhythm
Gastrointestinal: soft
Back: normal inspection
Extremities: normal range of motion
Neurologic/Psych: awake, alert, oriented x 3
 
Core Measures
ACS in differential dx? No
Sepsis Present: No
Sepsis Focused Exam Completed? No
(Finesse Lauren)
 
Progress
Differential Diagnosis: appendicitis, bowel obstruction, diverticulitis
Plan of Care:
 Orders
 
 
Procedure Date/time Status
 
LIPASE  1058 Complete
 
COMPREHENSIVE METABOLIC PANEL  1058 Complete
 
CBC WITHOUT DIFFERENTIAL  1058 Complete
 
URINE PREGNANCY  1042 Complete
 
URINALYSIS  1042 Complete
 
 
 Laboratory Tests
 
 
18 1111:
Anion Gap 10, Estimated GFR > 60, BUN/Creatinine Ratio 16.7, Glucose 90, Calcium
9.8, Total Bilirubin 0.8, AST 18, ALT 25, Alkaline Phosphatase 58, Total Protein
7.1, Albumin 4.6, Globulin 2.5, Albumin/Globulin Ratio 1.8, Lipase 22  L, CBC w 
Diff NO MAN DIFF REQ, RBC 4.42, MCV 90.0, MCH 30.9, MCHC 34.3, RDW 13.5, MPV 8.3
, Gran % 67.3, Lymphocytes % 22.9, Monocytes % 7.8, Eosinophils % 1.5, Basophils
% 0.5, Absolute Granulocytes 5.7, Absolute Lymphocytes 1.9, Absolute Monocytes 
0.7  H, Absolute Eosinophils 0.1, Absolute Basophils 0
 
18 1045:
Urinalysis LIGHT  H, Urine Color YEL, Urine Clarity HAZY  H, Urine pH 7.0, Ur 
Specific Gravity 1.020, Urine Protein NEG, Urine Ketones 15  H, Urine Nitrite 
NEG, Urine Bilirubin NEG, Urine Urobilinogen 0.2, Ur Leukocyte Esterase NEG, Ur 
Microscopic SEDIMENT EXAMINED, Urine RBC 1-3, Urine WBC RARE, Ur Epithelial 
Cells FEW, Urine Bacteria FEW  H, Urine Hemoglobin NEG, Urine Glucose NEG, Urine
Pregnancy Test NEGATIVE
 
Diagnostic Imaging:
Viewed by Me: CT Scan.  Discussed w/RAD: CT Scan. 
Radiology Impression: PATIENT: PAGE NAIR  MEDICAL RECORD NO: [id number] 
PRESENT AGE: 28  PATIENT ACCOUNT NO: [id number] : 88  LOCATION: [facility] 
ORDERING PHYSICIAN: Finesse BURGESS     SERVICE DATE:  EXAM TYPE
: CAT - CT ABD & PELVIS W IV CONTRAST EXAMINATION: CT ABDOMEN AND PELVIS WITH 
CONTRAST CLINICAL INFORMATION: Abdominal distention and pain. COMPARISON: 2016. TECHNIQUE: Multidetector volumetric imaging was performed of the abdomen 
and pelvis following IV administration of 95 mL of Optiray 320 intravenous 
contrast. Sagittal and coronal reformatted images were obtained on the 
technologist's workstation. DLP: 272 mGy-cm FINDINGS: LUNG BASES: Unremarkable. 
No basilar consolidation or pleural effusion. LIVER, GALLBLADDER, AND BILIARY 
TREE: The liver has normal size, shape, and attenuation. No focal hepatic 
lesion. Gallbladder is surgically absent. No intrahepatic or extrahepatic bile 
duct dilatation. PANCREAS: Unremarkable. SPLEEN: Unremarkable. ADRENAL GLANDS: 
Unremarkable. KIDNEYS AND URETERS: The kidneys have normal size, shape, and 
attenuation. No hydroureteronephrosis, urolithiasis or perinephric stranding. 
BLADDER: Unremarkable. BOWEL AND PERITONEUM: The small and large bowel are 
normal in caliber. The appendix appears to be surgically absent. No focal bowel 
wall thickening or mesenteric fat stranding. No evidence of acute inflammation 
or obstruction along the gastrointestinal tract.  No ascites or 
pneumoperitoneum. ABDOMINAL WALL: No acute findings. Chronic, mild diastases of 
rectus abdominis muscles of the mid abdominal wall. Minimal protrusion of fat 
into the umbilicus. LYMPH NODES: No pathologic sized lymph nodes in the abdomen 
or pelvis. No inguinal lymphadenopathy. VASCULAR: Unremarkable. PELVIC: The 
anteflexed uterus is normal in size. A corpus luteum cyst of the left ovary 
measures up to 2.7 cm. No pelvic free fluid. MUSCULOSKELETAL: No suspicious bone
lesions. There is an old right-sided pars interarticularis defect of L5. 
IMPRESSION: No acute findings in the abdomen or pelvis compared to 2016. 
No evidence of bowel obstruction or ascites. DICTATED BY: Micky Dukes MD  
DATE/TIME DICTATED:18 :RAD.VILLAGOMEZ  DATE/TIME 
TRANSCRIBED:18 CONFIDENTIAL, DO NOT COPY WITHOUT APPROPRIATE 
AUTHORIZATION.  <Electronically signed in Other Vendor System>                  
                                                                     SIGNED BY: 
Micky Dukes MD 18 1418
Initial ED EKG: none
(Finesse Lauren)
 
Departure
 
Departure
Disposition: HOME OR SELF CARE
Condition: Stable
Clinical Impression
Primary Impression: Abdominal pain
Referrals:
Magno Lockwood MD (PCP/Family)
 
Additional Instructions:
Follow-up with CAT scan.  Return if any concerns worsening symptoms.  You're 
leaving for the results of CT scan are back.
 
Please go over all results of today's visit with your primary care doctor.  
Contact your primary care doctor to let them know you were here in the emergency
room.  There may be nonspecific findings which may not be related to your visit 
today here in the emergency room but may require further evaluation and chronic 
monitoring by your primary care doctor.  If you had a laceration today the 
chance of foreign body always remains. You should follow-up with your primary 
care doctor for recheck in 3-5 days for a wound check.  If you had an x-ray done
there is a chance that a fracture could have been missed on initial read and you
should follow-up with your primary care doctor for repeat x-rays if symptoms 
persist.  If your blood pressure was elevated here in the emergency room please 
have rechecked by [facility] primary care doctor within the next 48.  If you were 
prescribed a narcotic here in the emergency room or any type of controlled 
substances you're not allowed to drive while taking this medication or operate 
any type of heavy machinery.  Narcotics can make you feel lightheaded dizziness 
nausea and can cause constipation.  You may need to  a stool softener.  
Thank you for choosing [facility] emergency room.  Please return to the 
emergency room immediately if you have any other concerns worsening of symptoms.
 
Departure Forms:
Customer Survey
General Discharge Information
Comments
18
 
Patient had to leave before the results of the CT scan came back.  No acute 
findings.  Told to follow-up with her PCP and GI doctor.  She was recommended 
that she not leave before the results of the CAT scan come back but she has to 
 her daughter.  She understands risks.
(Finesse Lauren)
 
PA/NP Co-Sign Statement
Statement:
ED Attending supervision documentation-
 
 I saw and evaluated the patient. I have also reviewed all the pertinent lab 
results and diagnostic results. I agree with the findings and the plan of care 
as documented in the PA's/NP's documentation. 
 
x I have reviewed the ED Record and agree with the PA's/NP's documentation.
 
[] Additions or exceptions (if any) to the PAs/NP's note and plan are 
summarized below:
[]
 
(Alfredo SIMONS,Kiko)